# Patient Record
Sex: FEMALE | Race: WHITE
[De-identification: names, ages, dates, MRNs, and addresses within clinical notes are randomized per-mention and may not be internally consistent; named-entity substitution may affect disease eponyms.]

---

## 2017-04-12 NOTE — P.PCN
Date of Procedure: 04/12/17


Description of Procedure: 











PREOPERATIVE DIAGNOSIS:


Colonoscopy screening.





POSTOPERATIVE DIAGNOSIS:


Colonoscopy screening.





OPERATION:


Colonoscopy to the ileocecal valve and appendiceal orifice.





SURGEON: Mariely Álvarez MD.





ANESTHESIA: MAC.





INDICATIONS:


The patient is a 62-year-old female who presents for colonoscopy screening.  

Benefits and risks were described and informed consent was obtained.





DESCRIPTION OF PROCEDURE:


The patient had undergone Gatorade, MiraLAX and Dulcolax prep. He had been 

brought into the operating room and laid in the left lateral decubitus 

position. After adequate intravenous sedation, the rectum was examined with 2% 

lidocaine jelly. No external hemorrhoids were encountered. The rectal tone was 

within normal limits. No lesions were palpated in the rectal vault. An Olympus 

colonoscope was advanced until the ileocecal valve and appendiceal orifice were 

clearly viewed.  The prep was excellent with clear visualization of the mucosal 

folds.   The scope was removed with visualization of each mucosal fold.  

Scattered diverticulosis was encountered.  No colonic polyps were found.  No 

evidence of focal colitis was found. Retroflexion of the scope demonstrated 

grade 1 internal hemorrhoids without active bleeding or inflammation. The colon 

was desufflated. The patient had tolerated the procedure well. 





Withdrawal time was over 6 minutes.





FINDINGS:


Internal hemorrhoids, grade 1


No external prolapsed hemorrhoids.


No arteriovenous malformations.


No adenomatous polyps.


No focal colitis.





RECOMMENDATIONS:


Lower endoscopy every 10 years per screening guidelines, 2027.





Plan - Discharge Summary


Discharge Medication List





Acetaminophen Tab [Tylenol Tab] 650 mg PO Q4H PRN 04/07/17 [History]


Aspirin 325 mg PO DAILY 04/07/17 [History]


Fish Oil/Dha/Epa [Fish Oil 1,200 mg Fish Oil] 1 each PO DAILY 04/07/17 [History]


Hydrochlorothiazide 25 mg PO DAILY 04/07/17 [History]


Ibuprofen [Motrin] 800 mg PO DAILY PRN 04/07/17 [History]


Levothyroxine Sodium [Levoxyl] 88 mcg PO DAILY 04/07/17 [History]


Lisinopril [Zestril] 10 mg PO HS 04/07/17 [History]


Loratadine 10 mg PO HS 04/07/17 [History]


Lovastatin [Mevacor] 20 mg PO HS 04/07/17 [History]


Multivitamins, Thera [Multivitamin (formulary)] 1 tab PO DAILY 04/07/17 [History

]


Omega-3 Fatty Acids [Omega-3] 600 mg PO DAILY 04/07/17 [History]


Potassium Chloride [Klor-Con 10] 10 meq PO DAILY 04/07/17 [History]


traMADol HCl [Ultram] 50 mg PO Q4-6H PRN 04/07/17 [History]








Follow up Appointment(s)/Referral(s): 


Mariely Álvarez MD [STAFF PHYSICIAN] - As Needed (May follow-up as needed 

[place])


Patient Instructions/Handouts:  *[facility] - (Anesthesia) Endoscopy Discharge 

Instructions


Activity/Diet/Wound Care/Special Instructions: 


Follow-up colonoscopy at age of 72, 2027, in 10 years.


Discharge Disposition: HOME SELF-CARE

## 2017-04-12 NOTE — P.GSHP
History of Present Illness


H&P Date: 04/12/17














CHIEF COMPLAINT: Colon screen





HISTORY OF PRESENT ILLNESS: The patient is a 62-year-old female who


presents for colon screen.  Lower endoscopy was offered for further evaluation 

and management.





PAST MEDICAL HISTORY: 


Please see list.





PAST SURGICAL HISTORY: 


Please see list.





MEDICATIONS: 


Please see list.





ALLERGIES:  Please see list. 





SOCIAL HISTORY: No illicit drug use





FAMILY HISTORY: No reports of Crohn disease or ulcerative colitis. 





REVIEW OF ORGAN SYSTEMS: 


CONSTITUTIONAL: No reports of fevers or chills.  





PHYSICAL EXAM: 


VITAL SIGNS:  Stable


GENERAL: Well-developed pleasant in no acute distress. 


HEENT: No scleral icterus. Extraocular movements grossly


intact. Moist buccal mucosa. 


NECK: Supple without lymphadenopathy. 


CHEST: Unlabored respirations. Equal bilateral excursions. 


CARDIOVASCULAR: Regular rate and rhythm. Distal 2+ pulses. 


ABDOMEN: Soft, nontender, nondistended. 


MUSCULOSKELETAL: No clubbing, cyanosis, or edema. 





ASSESSMENT: 


1.  Colon screen.





PLAN: 


1. Recommend proceeding with a lower endoscopy





Past Medical History


Past Medical History: Hyperlipidemia, Hypertension, Osteoarthritis (OA), 

Thyroid Disorder


Additional Past Medical History / Comment(s): rt hip pain


History of Any Multi-Drug Resistant Organisms: None Reported


Past Surgical History: Back Surgery, Breast Surgery, Cholecystectomy, 

Hysterectomy, Joint Replacement, Tubal Ligation


Additional Past Surgical History / Comment(s): teddy hip replacements, lt breast 

biopsy, lumbar surgery with cage L4-L5


Past Anesthesia/Blood Transfusion Reactions: No Reported Reaction


Smoking Status: Never smoker


Past Alcohol Use History: None Reported


Past Drug Use History: None Reported





- Past Family History


  ** Father


Family Medical History: Coronary Artery Disease (CAD), CVA/TIA





  ** Brother(s)


Family Medical History: Myocardial Infarction (MI)





Medications and Allergies


 Home Medications











 Medication  Instructions  Recorded  Confirmed  Type


 


Acetaminophen Tab [Tylenol Tab] 650 mg PO Q4H PRN 04/07/17 04/07/17 History


 


Aspirin 325 mg PO DAILY 04/07/17 04/07/17 History


 


Fish Oil/Dha/Epa [Fish Oil 1,200 1 each PO DAILY 04/07/17 04/07/17 History





mg Fish Oil]    


 


Hydrochlorothiazide 25 mg PO DAILY 04/07/17 04/07/17 History


 


Ibuprofen [Motrin] 800 mg PO DAILY PRN 04/07/17 04/07/17 History


 


Levothyroxine Sodium [Levoxyl] 88 mcg PO DAILY 04/07/17 04/07/17 History


 


Lisinopril [Zestril] 10 mg PO HS 04/07/17 04/07/17 History


 


Loratadine 10 mg PO HS 04/07/17 04/07/17 History


 


Lovastatin [Mevacor] 20 mg PO HS 04/07/17 04/07/17 History


 


Multivitamins, Thera [Multivitamin 1 tab PO DAILY 04/07/17 04/07/17 History





(formulary)]    


 


Omega-3 Fatty Acids [Omega-3] 600 mg PO DAILY 04/07/17 04/07/17 History


 


Potassium Chloride [Klor-Con 10] 10 meq PO DAILY 04/07/17 04/07/17 History


 


traMADol HCl [Ultram] 50 mg PO Q4-6H PRN 04/07/17 04/07/17 History











 Allergies











Allergy/AdvReac Type Severity Reaction Status Date / Time


 


sulfamethoxazole Allergy  itching, Verified 04/07/17 13:26





[From Bactrim]   and feet  





   peeled  


 


trimethoprim [From Bactrim] Allergy  itching, Verified 04/07/17 13:26





   and feet  





   peeled

## 2019-07-19 NOTE — P.GSHP
History of Present Illness


H&P Date: 07/19/19

















CHIEF COMPLAINT: History of intra-abdominal adhesions





HISTORY OF PRESENT ILLNESS: The patient is a 64-year-old female who


presents with history of intra-abdominal adhesions from multiple prior surgeries

including increasing abdominal pain.


She now presents for diagnostic laparoscopy including lysis of adhesions.





PAST MEDICAL HISTORY: 


Please see list.





PAST SURGICAL HISTORY: 


Please see list.





MEDICATIONS: 


Please see list.





ALLERGIES:  Please see list. 





SOCIAL HISTORY: No illicit drug use





FAMILY HISTORY: No reports of Crohn disease or ulcerative colitis. 





REVIEW OF ORGAN SYSTEMS: 


CONSTITUTIONAL: No reports of fevers or chills. 


GI:  Denies any blood in stools or constipation. 





PHYSICAL EXAM: 


VITAL SIGNS:  Stable


GENERAL: Well-developed pleasant and in no acute distress. 


HEENT: No scleral icterus. Extraocular movements grossly


intact. Moist buccal mucosa. 


NECK: Supple without lymphadenopathy. 


CHEST: Unlabored respirations. Equal bilateral excursions. 


CARDIOVASCULAR: Regular rate and rhythm. Distal 2+ pulses. 


ABDOMEN: Soft, diffuse abdominal tenderness.  No peritonitis.


MUSCULOSKELETAL: No clubbing, cyanosis, or edema. 





ASSESSMENT: 


1.  Diffuse abdominal pain.


2.  History of multiple abdominal surgeries.


3.  Intra-abdominal adhesions.





PLAN: 


1.  Robotic lysis of adhesions were described in detail including risk of injury

to the intestine, need for further surgery, and open technique.


2.  DVT prophylaxis.


3.  Antibiotic prophylaxis.





Past Medical History


Past Medical History: Hyperlipidemia, Hypertension, Osteoarthritis (OA), Thyroid

Disorder


Additional Past Medical History / Comment(s): .


History of Any Multi-Drug Resistant Organisms: None Reported


Past Surgical History: Back Surgery, Breast Surgery, Cholecystectomy, 

Hysterectomy, Joint Replacement, Tubal Ligation


Additional Past Surgical History / Comment(s): teddy hip replacements, lt breast 

biopsy, lumbar surgery with cage L4-L5, surgery L2 and L3, rt hip revision, rt 

elbow surgery


Past Anesthesia/Blood Transfusion Reactions: No Reported Reaction


Smoking Status: Never smoker





- Past Family History


  ** Father


Family Medical History: Coronary Artery Disease (CAD), CVA/TIA





  ** Brother(s)


Family Medical History: Myocardial Infarction (MI)





  ** Mother


Family Medical History: No Reported History





Medications and Allergies


                                Home Medications











 Medication  Instructions  Recorded  Confirmed  Type


 


Acetaminophen Tab [Tylenol Tab] 650 mg PO Q4H PRN 04/07/17 07/17/19 History


 


Aspirin 325 mg PO HS 04/07/17 07/17/19 History


 


Fish Oil/Dha/Epa [Fish Oil 1,200 1 each PO W/LUNCH 04/07/17 07/17/19 History





mg Fish Oil]    


 


Hydrochlorothiazide 25 mg PO W/LUNCH 04/07/17 07/17/19 History


 


Ibuprofen [Motrin] 800 mg PO DAILY PRN 04/07/17 07/17/19 History


 


Levothyroxine Sodium [Levoxyl] 88 mcg PO AC-BRKFST 04/07/17 07/17/19 History


 


Lisinopril [Zestril] 10 mg PO HS 04/07/17 07/17/19 History


 


Loratadine 10 mg PO W/LUNCH 04/07/17 07/17/19 History


 


Lovastatin [Mevacor] 20 mg PO HS 04/07/17 07/17/19 History


 


Multivitamins, Thera [Multivitamin 1 tab PO W/LUNCH 04/07/17 07/17/19 History





(formulary)]    


 


Potassium Chloride [Klor-Con 10] 10 meq PO W/LUNCH 04/07/17 07/17/19 History


 


traMADol HCl [Ultram] 50 mg PO Q4-6H PRN 04/07/17 07/17/19 History


 


Cholecalciferol (Vitamin D3) 5,000 unit PO W/LUNCH 07/17/19 07/17/19 History





[Vitamin D3]    


 


Magnesium 400 mg PO W/LUNCH 07/17/19 07/17/19 History


 


Ubidecarenone [Co Q-10] 200 mg PO W/LUNCH 07/17/19 07/17/19 History








                                    Allergies











Allergy/AdvReac Type Severity Reaction Status Date / Time


 


sulfamethoxazole Allergy  itching, Verified 07/17/19 14:53





[From Bactrim]   and feet  





   peeled  


 


trimethoprim [From Bactrim] Allergy  itching, Verified 07/17/19 14:53





   and feet  





   peeled

## 2020-03-19 NOTE — P.OP
Date of Procedure: 07/19/19


Description of Procedure: 





Date of Procedure: 07/19/19








SURGEON:  MARIELY ÁLVAREZ MD


PREOPERATIVE DIAGNOSES:  


1.  Right upper quadrant abdominal pain


2.  Right lower quadrant abdominal pain


3.  History of open cholecystectomy


4.  History of multiple abdominal procedures


5.  History of peritoneal adhesions


6.  Hypertensive heart disease


7.  Hyperlipidemia


8.  Hypothyroidism





POSTOPERATIVE DIAGNOSES:  


1.  Right upper quadrant abdominal pain


2.  Right lower quadrant abdominal pain


3.  History of open cholecystectomy


4.  History of multiple abdominal procedures


5.  History of peritoneal adhesions


6.  Hypertensive heart disease


7.  Hyperlipidemia


8.  Hypothyroidism





OPERATION:       


1. Robotic-assisted da Ike Xi laparoscopic extensive lysis of adhesions over 

45 minutes





Anesthesia: GETA, local


Estimated Blood Loss (ml): 5


Pathology: none sent


Condition: stable


Disposition: same day





Operative Findings: 


1.  In the preoperative area, patient was marked at specific locations of pain 

at the right upper quadrant and right lower quadrant with pulling sensation 

described


2.  Moderate peritoneal adhesions worse along the right lower quadrant and 

separate along the right upper quadrant identified confirming patient's 

symptoms.





INDICATIONS: The patient is a 64-year-old female who presents with severe right 

upper and lower quadrant abdominal pain.  She has history of multiple abdominal 

surgeries.  Robotic assisted laparoscopic approach was described. Benefits and 

risks of the procedure including but not limited to bleeding, infection, injury 

to the small bowel was described. Informed consent was obtained.  





DESCRIPTION OF PROCEDURE: Patient was brought to the operating room, placed in 

supine position. After general induction, the abdomen had been prepped and 

draped in standard sterile fashion. The robotic da Ike XI system was primed. 





After a timeout protocol was performed, the patient had been prepped and draped 

in standard sterile fashion.





The robot was docked along the right lateral abdomen. The patient was 

repositioned in Trendelenburg position of 10 degrees. A 5 mm 0 degrees 

laparoscopic trocar entry was performed along the left upper quadrant.  The 

abdomen was insufflated to 15 mmHg pressure which she tolerated well.  

Diagnostic laparoscopy demonstrated severe intra-abdominal adhesions involving 

the right lower and right upper quadrants.  The small bowel was unremarkable 

without evidence of dilation or suggestion of obstruction.  No injury to the 

bowel, viscera or mesentery was identified.





Next, three 8 mm robotic ports were placed along the left lateral abdominal 

wall.  The 5 mm trocar was exchanged for 8 mm port. Please note that the ports 

were placed at least 8 cm away from the target anatomy. 





Instruments were interchanged using only 3 ports for a grasper, vessel sealer, 

and scissors with cautery.  Instruments were interchanged by the assistant 

including Bovie cautery scissors. I had sat at the console.  





The camera was positioned along the left lateral abdominal wall.  Extensive 

lysis of adhesions over 45 minutes was performed.  Carefully the adhesions were 

taken down without injury to the small bowel using vessel sealer and cautery on 

scissors.  No involvement of small bowel was found.  Hemostasis was excellent 

and abdomen was dry upon completion.





The robot was undocked.





All pneumoperitoneum and instruments were evacuated from the abdominal cavity. 

The incisions were reapproximated using 4-0 Monocryl in an interrupted 

subcuticular fashion. Please note along the trocar sites, local anesthetic was 

placed as a field block prior to insertion of all instruments.  





Liquid glue was applied to the skin.





At the end of the procedure needle, sponge, and instrument count had been 

verified correct by the surgical technician. The patient was transferred to 

postanesthesia care unit in stable condition.





Intraoperative images including findings were described to the patients family.





Plan - Discharge Summary


Discharge Rx Participant: No


New Discharge Prescriptions: 


Continue


   Lovastatin [Mevacor] 20 mg PO HS


   Lisinopril [Zestril] 10 mg PO HS


   Levothyroxine Sodium [Levoxyl] 88 mcg PO AC-BRKFST


   traMADol HCl [Ultram] 50 mg PO Q4-6H PRN


     PRN Reason: Pain


   Fish Oil/Dha/Epa [Fish Oil 1,200 mg Fish Oil] 1 each PO W/LUNCH


   Multivitamins, Thera [Multivitamin (formulary)] 1 tab PO W/LUNCH


   Aspirin 325 mg PO HS


   Loratadine 10 mg PO W/LUNCH


   Ibuprofen [Motrin] 800 mg PO DAILY PRN


     PRN Reason: Pain


   Acetaminophen Tab [Tylenol] 650 mg PO Q4H PRN


     PRN Reason: Pain


   Cholecalciferol (Vitamin D3) [Vitamin D3] 5,000 unit PO W/LUNCH


   Ubidecarenone [Co Q-10] 200 mg PO W/LUNCH





Discontinued


   Potassium Chloride [Klor-Con 10] 10 meq PO W/LUNCH


   Hydrochlorothiazide 25 mg PO W/LUNCH


   Magnesium 400 mg PO W/LUNCH


Discharge Medication List





Acetaminophen Tab [Tylenol] 650 mg PO Q4H PRN 04/07/17 [History]


Aspirin 325 mg PO HS 04/07/17 [History]


Fish Oil/Dha/Epa [Fish Oil 1,200 mg Fish Oil] 1 each PO W/LUNCH 04/07/17 

[History]


Ibuprofen [Motrin] 800 mg PO DAILY PRN 04/07/17 [History]


Levothyroxine Sodium [Levoxyl] 88 mcg PO AC-BRKFST 04/07/17 [History]


Lisinopril [Zestril] 10 mg PO HS 04/07/17 [History]


Loratadine 10 mg PO W/LUNCH 04/07/17 [History]


Lovastatin [Mevacor] 20 mg PO HS 04/07/17 [History]


Multivitamins, Thera [Multivitamin (formulary)] 1 tab PO W/LUNCH 04/07/17 

[History]


traMADol HCl [Ultram] 50 mg PO Q4-6H PRN 04/07/17 [History]


Cholecalciferol (Vitamin D3) [Vitamin D3] 5,000 unit PO W/LUNCH 07/17/19 

[History]


Ubidecarenone [Co Q-10] 200 mg PO W/LUNCH 07/17/19 [History]








Follow up Appointment(s)/Referral(s): 


Mariely Álvarez MD [STAFF PHYSICIAN] - 07/30/19


Patient Instructions/Handouts:  *[facility] - (Anesthesia) Discharge Inst

ructions Outpatient Surgery, Lysis of Abdominal Adhesions (IP)


Activity/Diet/Wound Care/Special Instructions: 


May shower.  No bath tub soaks for 10 days until 07/29/2019.  No lifting over 10

pounds for 10 days through 07/29/2019.  Please take home pain medications 

including ibuprofen and Tylenol as needed for pain.


Discharge Disposition: HOME SELF-CARE Awake/Cooperative/Alert

## 2021-03-11 ENCOUNTER — HOSPITAL ENCOUNTER (OUTPATIENT)
Dept: HOSPITAL 47 - ORWHC2ENDO | Age: 66
Discharge: HOME | End: 2021-03-11
Attending: SURGERY
Payer: COMMERCIAL

## 2021-03-11 VITALS — TEMPERATURE: 97.9 F | RESPIRATION RATE: 16 BRPM

## 2021-03-11 VITALS — DIASTOLIC BLOOD PRESSURE: 74 MMHG | SYSTOLIC BLOOD PRESSURE: 116 MMHG | HEART RATE: 59 BPM

## 2021-03-11 VITALS — BODY MASS INDEX: 37.2 KG/M2

## 2021-03-11 DIAGNOSIS — Z91.09: ICD-10-CM

## 2021-03-11 DIAGNOSIS — Z79.82: ICD-10-CM

## 2021-03-11 DIAGNOSIS — E78.5: ICD-10-CM

## 2021-03-11 DIAGNOSIS — Z79.899: ICD-10-CM

## 2021-03-11 DIAGNOSIS — Z90.710: ICD-10-CM

## 2021-03-11 DIAGNOSIS — Z98.51: ICD-10-CM

## 2021-03-11 DIAGNOSIS — Z88.2: ICD-10-CM

## 2021-03-11 DIAGNOSIS — G56.03: ICD-10-CM

## 2021-03-11 DIAGNOSIS — I10: ICD-10-CM

## 2021-03-11 DIAGNOSIS — K64.0: ICD-10-CM

## 2021-03-11 DIAGNOSIS — Z82.49: ICD-10-CM

## 2021-03-11 DIAGNOSIS — M19.90: ICD-10-CM

## 2021-03-11 DIAGNOSIS — Z80.0: ICD-10-CM

## 2021-03-11 DIAGNOSIS — E07.9: ICD-10-CM

## 2021-03-11 DIAGNOSIS — Z90.49: ICD-10-CM

## 2021-03-11 DIAGNOSIS — Z98.890: ICD-10-CM

## 2021-03-11 DIAGNOSIS — Z96.643: ICD-10-CM

## 2021-03-11 DIAGNOSIS — Z79.890: ICD-10-CM

## 2021-03-11 DIAGNOSIS — K57.90: Primary | ICD-10-CM

## 2021-03-11 PROCEDURE — 45378 DIAGNOSTIC COLONOSCOPY: CPT

## 2021-03-11 NOTE — P.PCN
Date of Procedure: 03/11/21


Description of Procedure: 











PREOPERATIVE DIAGNOSIS:


Abnormal occult stool study


Mother with malignant colon polyp





POSTOPERATIVE DIAGNOSIS:


Mother with malignant colon polyp


Colonoscopy screening.


Diverticulosis, scattered.





OPERATION:


Colonoscopy to the cecum, ileocecal valve and appendiceal orifice.





SURGEON: Mariely Álvarez MD.





ANESTHESIA: MAC.





INDICATIONS:


The patient is a 65-year-old female who presents for colonoscopy due to abnormal

stool occult study.  Benefits and risks were described and informed consent was 

obtained.





DESCRIPTION OF PROCEDURE:


The patient had undergone Gatorade, MiraLAX and Dulcolax prep.  The patient had 

been brought into the operating room and laid in the left lateral decubitus 

position. After adequate intravenous sedation, the rectum was examined with 2% 

lidocaine jelly. No external hemorrhoids were encountered. The rectal tone was 

within normal limits. No lesions were palpated in the rectal vault. An Olympus 

colonoscope was advanced until the cecum, ileocecal valve and appendiceal 

orifice were clearly viewed.  The prep was excellent. Scattered diverticulosis 

was encountered.  No colonic polyps were found.  No evidence of focal colitis 

was found. Retroflexion of the scope demonstrated grade 1 internal hemorrhoids 

without active bleeding or inflammation. The colon was desufflated. The patient 

had tolerated the procedure well. 





Withdrawal time was over 6 minutes.





FINDINGS:


Aronchick preparation quality scale 2 (1-5)


Internal hemorrhoids, grade 1


No external prolapsed hemorrhoids.


No arteriovenous malformations.


No adenomatous polyps.


Scattered diverticulosis


No focal colitis.





RECOMMENDATIONS:


Lower endoscopy in 5 years, 2026





Plan - Discharge Summary


Discharge Rx Participant: No


New Discharge Prescriptions: 


Continue


   Lovastatin [Mevacor] 20 mg PO HS


   lisinopriL [Zestril] 10 mg PO HS


   Levothyroxine Sodium [Levoxyl] 88 mcg PO AC-BRKFST


   Fish Oil/Dha/Epa [Fish Oil 1,200 mg Fish Oil] 1 each PO W/LUNCH


   Aspirin 325 mg PO HS


   Loratadine 10 mg PO W/LUNCH


   Ibuprofen [Motrin] 800 mg PO DAILY PRN


     PRN Reason: Pain


   Cholecalciferol (Vitamin D3) [Vitamin D3] 5,000 unit PO W/LUNCH


   Ubidecarenone [Co Q-10] 200 mg PO W/LUNCH


   hydroCHLOROthiazide 25 mg PO DAILY


   Vit C/E/Zn/Coppr/Lutein/Zeaxan [Preservision Areds 2 Softgel] 1 each PO DAILY


   Potassium Chloride [Klor-Con 10] 10 meq PO DAILY


   Magnesium 400 mg PO DAILY


   Eye Health Complex 1 tab PO DAILY


   Zinc 5.5 mg PO DAILY


   Zinc 50 mg PO DAILY


   Vitamin E 400 unit PO Q48H


   Ascorbic Acid [Vitamin C] 1,000 mg PO DAILY


Discharge Medication List





Aspirin 325 mg PO HS 04/07/17 [History]


Fish Oil/Dha/Epa [Fish Oil 1,200 mg Fish Oil] 1 each PO W/LUNCH 04/07/17 

[History]


Ibuprofen [Motrin] 800 mg PO DAILY PRN 04/07/17 [History]


Levothyroxine Sodium [Levoxyl] 88 mcg PO AC-BRKFST 04/07/17 [History]


Loratadine 10 mg PO W/LUNCH 04/07/17 [History]


Lovastatin [Mevacor] 20 mg PO HS 04/07/17 [History]


lisinopriL [Zestril] 10 mg PO HS 04/07/17 [History]


Cholecalciferol (Vitamin D3) [Vitamin D3] 5,000 unit PO W/LUNCH 07/17/19 

[History]


Ubidecarenone [Co Q-10] 200 mg PO W/LUNCH 07/17/19 [History]


Ascorbic Acid [Vitamin C] 1,000 mg PO DAILY 03/08/21 [History]


Eye Health Complex 1 tab PO DAILY 03/08/21 [History]


Magnesium 400 mg PO DAILY 03/08/21 [History]


Potassium Chloride [Klor-Con 10] 10 meq PO DAILY 03/08/21 [History]


Vit C/E/Zn/Coppr/Lutein/Zeaxan [Preservision Areds 2 Softgel] 1 each PO DAILY 

03/08/21 [History]


Vitamin E 400 unit PO Q48H 03/08/21 [History]


Zinc 5.5 mg PO DAILY 03/08/21 [History]


Zinc 50 mg PO DAILY 03/08/21 [History]


hydroCHLOROthiazide 25 mg PO DAILY 03/08/21 [History]








Follow up Appointment(s)/Referral(s): 


Mariely Álvarez MD [STAFF PHYSICIAN] - As Needed


Patient Instructions/Handouts:  *[facility] - (Anesthesia) Endoscopy Discharge 

Instructions, Colonoscopy (DC), Diverticulosis Diet (GEN), Diverticulosis (DC)


Activity/Diet/Wound Care/Special Instructions: 


Repeat colonoscopy in 5 years, 2026


Discharge Disposition: HOME SELF-CARE

## 2021-03-11 NOTE — P.GSHP
History of Present Illness


H&P Date: 03/11/21














CHIEF COMPLAINT: Colon screen





HISTORY OF PRESENT ILLNESS: The patient is a 65-year-old female who


presents for colon screen.  Lower endoscopy was offered for further evaluation 

and management.





PAST MEDICAL HISTORY: 


Please see list.





PAST SURGICAL HISTORY: 


Please see list.





MEDICATIONS: 


Please see list.





ALLERGIES:  Please see list. 





SOCIAL HISTORY: No illicit drug use





FAMILY HISTORY: No reports of Crohn disease or ulcerative colitis. 





REVIEW OF ORGAN SYSTEMS: 


CONSTITUTIONAL: No reports of fevers or chills.  





PHYSICAL EXAM: 


VITAL SIGNS:  Stable


GENERAL: Well-developed pleasant in no acute distress. 


HEENT: No scleral icterus. Extraocular movements grossly


intact. Moist buccal mucosa. 


NECK: Supple without lymphadenopathy. 


CHEST: Unlabored respirations. Equal bilateral excursions. 


CARDIOVASCULAR: Regular rate and rhythm. Distal 2+ pulses. 


ABDOMEN: Soft, nontender, nondistended. 


MUSCULOSKELETAL: No clubbing, cyanosis, or edema. 





ASSESSMENT: 


1.  Colon screen.





PLAN: 


1. Recommend proceeding with a lower endoscopy





Past Medical History


Past Medical History: Hyperlipidemia, Hypertension, Musculoskeletal Disorder, 

Osteoarthritis (OA), Thyroid Disorder


Additional Past Medical History / Comment(s): Back pain. Tingling in pinky 

finger right hand, bilateral carpal tunnel. Hemorrhoid.


History of Any Multi-Drug Resistant Organisms: None Reported


Past Surgical History: Back Surgery, Breast Surgery, Cholecystectomy, 

Hysterectomy, Joint Replacement, Tubal Ligation


Additional Past Surgical History / Comment(s): Bilateral hip replacements, left 

breast biopsy, lumbar surgery with cage L4-L5, surgery L2-L3, right hip 

revision, right elbow surgery.


Past Anesthesia/Blood Transfusion Reactions: No Reported Reaction


Past Psychological History: No Psychological Hx Reported


Smoking Status: Never smoker


Past Alcohol Use History: None Reported


Past Drug Use History: None Reported





- Past Family History


  ** Father


Family Medical History: Coronary Artery Disease (CAD), CVA/TIA





  ** Brother(s)


Family Medical History: Myocardial Infarction (MI)





  ** Mother


Family Medical History: No Reported History





Medications and Allergies


                                Home Medications











 Medication  Instructions  Recorded  Confirmed  Type


 


Aspirin 325 mg PO HS 04/07/17 03/08/21 History


 


Fish Oil/Dha/Epa [Fish Oil 1,200 1 each PO W/LUNCH 04/07/17 03/08/21 History





mg Fish Oil]    


 


Ibuprofen [Motrin] 800 mg PO DAILY PRN 04/07/17 03/08/21 History


 


Levothyroxine Sodium [Levoxyl] 88 mcg PO AC-[id number] 04/07/17 03/08/21 History


 


Loratadine 10 mg PO W/LUNCH 04/07/17 03/08/21 History


 


Lovastatin [Mevacor] 20 mg PO HS 04/07/17 03/08/21 History


 


lisinopriL [Zestril] 10 mg PO HS 04/07/17 03/08/21 History


 


Cholecalciferol (Vitamin D3) 5,000 unit PO W/LUNCH 07/17/19 03/08/21 History





[Vitamin D3]    


 


Ubidecarenone [Co Q-10] 200 mg PO W/LUNCH 07/17/19 03/08/21 History


 


Ascorbic Acid [Vitamin C] 1,000 mg PO DAILY 03/08/21 03/08/21 History


 


Eye Health Complex 1 tab PO DAILY 03/08/21 03/08/21 History


 


Magnesium 400 mg PO DAILY 03/08/21 03/08/21 History


 


Potassium Chloride [Klor-Con 10] 10 meq PO DAILY 03/08/21 03/08/21 History


 


Vit C/E/Zn/Coppr/Lutein/Zeaxan 1 each PO DAILY 03/08/21 03/08/21 History





[Preservision Areds 2 Softgel]    


 


Vitamin E 400 unit PO Q48H 03/08/21 03/08/21 History


 


Zinc 5.5 mg PO DAILY 03/08/21 03/08/21 History


 


Zinc 50 mg PO DAILY 03/08/21 03/08/21 History


 


hydroCHLOROthiazide 25 mg PO DAILY 03/08/21 03/08/21 History








                                    Allergies











Allergy/AdvReac Type Severity Reaction Status Date / Time


 


sulfamethoxazole Allergy  itching, Verified 03/08/21 11:43





[From Bactrim]   and feet  





   peeled  


 


trimethoprim [From Bactrim] Allergy  itching, Verified 03/08/21 11:43





   and feet  





   peeled  


 


Plastic surgical tape Allergy  Skin Uncoded 03/08/21 12:18





   Blisters